# Patient Record
Sex: FEMALE | Race: WHITE | URBAN - METROPOLITAN AREA
[De-identification: names, ages, dates, MRNs, and addresses within clinical notes are randomized per-mention and may not be internally consistent; named-entity substitution may affect disease eponyms.]

---

## 2017-10-08 ENCOUNTER — HOSPITAL ENCOUNTER (EMERGENCY)
Facility: CLINIC | Age: 27
Discharge: HOME | End: 2017-10-08
Attending: EMERGENCY MEDICINE | Admitting: EMERGENCY MEDICINE
Payer: COMMERCIAL

## 2017-10-08 VITALS
DIASTOLIC BLOOD PRESSURE: 76 MMHG | RESPIRATION RATE: 16 BRPM | HEART RATE: 67 BPM | OXYGEN SATURATION: 98 % | SYSTOLIC BLOOD PRESSURE: 116 MMHG | TEMPERATURE: 98.7 F

## 2017-10-08 DIAGNOSIS — F41.0 PANIC ATTACK: ICD-10-CM

## 2017-10-08 DIAGNOSIS — F45.8 ACUTE HYPERVENTILATION SYNDROME: ICD-10-CM

## 2017-10-08 LAB
ALBUMIN UR-MCNC: 30 MG/DL
ANION GAP SERPL CALCULATED.3IONS-SCNC: 8 MMOL/L (ref 3–14)
APPEARANCE UR: ABNORMAL
BASOPHILS # BLD AUTO: 0.1 10E9/L (ref 0–0.2)
BASOPHILS NFR BLD AUTO: 0.7 %
BILIRUB UR QL STRIP: NEGATIVE
BUN SERPL-MCNC: 13 MG/DL (ref 7–30)
CA-I SERPL ISE-MCNC: 4.7 MG/DL (ref 4.4–5.2)
CALCIUM SERPL-MCNC: 9.5 MG/DL (ref 8.5–10.1)
CHLORIDE SERPL-SCNC: 104 MMOL/L (ref 94–109)
CO2 SERPL-SCNC: 27 MMOL/L (ref 20–32)
COLOR UR AUTO: YELLOW
CREAT SERPL-MCNC: 0.76 MG/DL (ref 0.52–1.04)
DIFFERENTIAL METHOD BLD: NORMAL
EOSINOPHIL # BLD AUTO: 0.4 10E9/L (ref 0–0.7)
EOSINOPHIL NFR BLD AUTO: 5.3 %
ERYTHROCYTE [DISTWIDTH] IN BLOOD BY AUTOMATED COUNT: 11.9 % (ref 10–15)
GFR SERPL CREATININE-BSD FRML MDRD: >90 ML/MIN/1.7M2
GLUCOSE SERPL-MCNC: 86 MG/DL (ref 70–99)
GLUCOSE UR STRIP-MCNC: NEGATIVE MG/DL
HCG UR QL: NEGATIVE
HCT VFR BLD AUTO: 44.8 % (ref 35–47)
HGB BLD-MCNC: 14.9 G/DL (ref 11.7–15.7)
HGB UR QL STRIP: NEGATIVE
IMM GRANULOCYTES # BLD: 0.1 10E9/L (ref 0–0.4)
IMM GRANULOCYTES NFR BLD: 1 %
KETONES UR STRIP-MCNC: NEGATIVE MG/DL
LEUKOCYTE ESTERASE UR QL STRIP: NEGATIVE
LIPASE SERPL-CCNC: 117 U/L (ref 73–393)
LYMPHOCYTES # BLD AUTO: 1.8 10E9/L (ref 0.8–5.3)
LYMPHOCYTES NFR BLD AUTO: 24.9 %
MCH RBC QN AUTO: 29.8 PG (ref 26.5–33)
MCHC RBC AUTO-ENTMCNC: 33.3 G/DL (ref 31.5–36.5)
MCV RBC AUTO: 90 FL (ref 78–100)
MONOCYTES # BLD AUTO: 0.5 10E9/L (ref 0–1.3)
MONOCYTES NFR BLD AUTO: 6.5 %
MUCOUS THREADS #/AREA URNS LPF: PRESENT /LPF
NEUTROPHILS # BLD AUTO: 4.3 10E9/L (ref 1.6–8.3)
NEUTROPHILS NFR BLD AUTO: 61.6 %
NITRATE UR QL: NEGATIVE
NRBC # BLD AUTO: 0 10*3/UL
NRBC BLD AUTO-RTO: 0 /100
PH UR STRIP: 9 PH (ref 5–7)
PLATELET # BLD AUTO: 234 10E9/L (ref 150–450)
POTASSIUM SERPL-SCNC: 3.4 MMOL/L (ref 3.4–5.3)
RBC # BLD AUTO: 5 10E12/L (ref 3.8–5.2)
RBC #/AREA URNS AUTO: <1 /HPF (ref 0–2)
SODIUM SERPL-SCNC: 139 MMOL/L (ref 133–144)
SOURCE: ABNORMAL
SP GR UR STRIP: 1.02 (ref 1–1.03)
SQUAMOUS #/AREA URNS AUTO: <1 /HPF (ref 0–1)
UROBILINOGEN UR STRIP-MCNC: 0 MG/DL (ref 0–2)
WBC # BLD AUTO: 7 10E9/L (ref 4–11)
WBC #/AREA URNS AUTO: 1 /HPF (ref 0–2)

## 2017-10-08 PROCEDURE — 25000128 H RX IP 250 OP 636: Performed by: EMERGENCY MEDICINE

## 2017-10-08 PROCEDURE — 99284 EMERGENCY DEPT VISIT MOD MDM: CPT | Mod: 25

## 2017-10-08 PROCEDURE — 83690 ASSAY OF LIPASE: CPT | Performed by: EMERGENCY MEDICINE

## 2017-10-08 PROCEDURE — 80048 BASIC METABOLIC PNL TOTAL CA: CPT | Performed by: EMERGENCY MEDICINE

## 2017-10-08 PROCEDURE — 82330 ASSAY OF CALCIUM: CPT | Performed by: EMERGENCY MEDICINE

## 2017-10-08 PROCEDURE — 81025 URINE PREGNANCY TEST: CPT | Performed by: EMERGENCY MEDICINE

## 2017-10-08 PROCEDURE — 96361 HYDRATE IV INFUSION ADD-ON: CPT

## 2017-10-08 PROCEDURE — 96375 TX/PRO/DX INJ NEW DRUG ADDON: CPT

## 2017-10-08 PROCEDURE — 93005 ELECTROCARDIOGRAM TRACING: CPT

## 2017-10-08 PROCEDURE — 81001 URINALYSIS AUTO W/SCOPE: CPT | Performed by: EMERGENCY MEDICINE

## 2017-10-08 PROCEDURE — 85025 COMPLETE CBC W/AUTO DIFF WBC: CPT | Performed by: EMERGENCY MEDICINE

## 2017-10-08 PROCEDURE — 96374 THER/PROPH/DIAG INJ IV PUSH: CPT

## 2017-10-08 RX ORDER — DIPHENHYDRAMINE HYDROCHLORIDE 50 MG/ML
25 INJECTION INTRAMUSCULAR; INTRAVENOUS ONCE
Status: COMPLETED | OUTPATIENT
Start: 2017-10-08 | End: 2017-10-08

## 2017-10-08 RX ORDER — DEXAMETHASONE SODIUM PHOSPHATE 10 MG/ML
10 INJECTION, SOLUTION INTRAMUSCULAR; INTRAVENOUS ONCE
Status: COMPLETED | OUTPATIENT
Start: 2017-10-08 | End: 2017-10-08

## 2017-10-08 RX ORDER — SODIUM CHLORIDE 9 MG/ML
1000 INJECTION, SOLUTION INTRAVENOUS CONTINUOUS
Status: DISCONTINUED | OUTPATIENT
Start: 2017-10-08 | End: 2017-10-08 | Stop reason: HOSPADM

## 2017-10-08 RX ADMIN — SODIUM CHLORIDE 1000 ML: 9 INJECTION, SOLUTION INTRAVENOUS at 19:19

## 2017-10-08 RX ADMIN — DEXAMETHASONE SODIUM PHOSPHATE 10 MG: 10 INJECTION, SOLUTION INTRAMUSCULAR; INTRAVENOUS at 19:20

## 2017-10-08 RX ADMIN — DIPHENHYDRAMINE HYDROCHLORIDE 25 MG: 50 INJECTION, SOLUTION INTRAMUSCULAR; INTRAVENOUS at 19:20

## 2017-10-08 RX ADMIN — PROCHLORPERAZINE EDISYLATE 10 MG: 5 INJECTION INTRAMUSCULAR; INTRAVENOUS at 19:19

## 2017-10-08 ASSESSMENT — ENCOUNTER SYMPTOMS
DIARRHEA: 0
HEADACHES: 1
VOMITING: 1
SHORTNESS OF BREATH: 1

## 2017-10-08 NOTE — ED AVS SNAPSHOT
[facility] Emergency Department    [address] [place] [address]    Phone:  [phone]    Fax:  [phone]                                       Lisa Melton   MRN: [id number]    Department:  [facility] Emergency Department   Date of Visit:  10/8/2017           Patient Information     Date Of Birth          1990        Your diagnoses for this visit were:     Acute hyperventilation syndrome     Panic attack        You were seen by Familia Campos MD.      Follow-up Information     Schedule an appointment as soon as possible for a visit with Lucia Blood.    Specialty:  Family Practice    Why:  As needed, If symptoms worsen    Contact information:    [facility]  [address]  [facility] [address]  [phone]          Discharge Instructions         Understanding Hyperventilation Syndrome  When you breathe, you get oxygen from the air you inhale. You then let out carbon dioxide with the air you exhale. Hyperventilation syndrome is a pattern of breathing during which you breathe more quickly and deeply than normal. This can be very distressing to experience. If it goes on for some time, it can cause the level of carbon dioxide in the blood to get too low. This can lead to symptoms felt throughout the body.  What causes hyperventilation syndrome?  Hyperventilation syndrome may be caused from things such as:    Anxiety or panic (most common)    Pregnancy    Bleeding    Infection    Certain heart and lung problems  Symptoms of hyperventilation syndrome  Symptoms can include:    Fast or deep breathing    Shortness of breath or the feeling that you can t get enough air    Anxiety, fear, panic, or strong feeling of dread or doom    Dizziness    Chest pain or squeezing in the chest    Fast, pounding, or skipping heartbeat    Sweating    Numbness or tingling around the mouth and in the fingers    Muscle cramps in the hands or  feet  Treatment for hyperventilation syndrome  Treatment is focused on getting your breathing rate and level of carbon dioxide in the blood back to normal. If you are being treated in a hospital or healthcare provider s office, the following may be done:    A healthcare provider may monitor the level of oxygen in your blood with a pulse oximeter.    A nurse or other healthcare provider will talk with you and help you to stay calm.    You may be asked to try various breathing exercises, such as pursed-lip breathing. This helps slow down your breathing. You may also be asked to hold your breath for short periods.    If needed, you may be told to breathe into a tube.    You may also be given medicine to help you relax.  How can hyperventilation syndrome be prevented?  To help prevent further episodes in the future, you may be told to try:    Breathing exercises    Relaxation methods such as meditation or progressive muscle relaxation    Regular exercise    Counseling or medicines to help manage an anxiety or panic disorder  Possible complications of hyperventilation syndrome  If the level of carbon dioxide becomes dangerously low, this is called hypocapnia. It can upset the acid-base balance in the blood and cause problems such as fainting and seizures.  Other possible complications of hyperventilation syndrome will vary depending on the cause.  When to call your healthcare provider  Call your healthcare provider right away if you have any of these:    Fever of 100.4 F (38 C) or higher, or as directed    Symptoms that don t get better with treatment, or symptoms that get worse    New symptoms   Date Last Reviewed: 5/1/2016 2000-2017 [employer]. [address] [place] [address], [place] [address]. All rights reserved. This information is not intended as a substitute for professional medical care. Always follow your healthcare professional's instructions.          24 Hour Appointment Hotline       To make an  appointment at any [facility], call [phone] ([phone]). If you don't have a family doctor or clinic, we will help you find one. [facility] are conveniently located to serve the needs of you and your family.             Review of your medicines      Notice     You have not been prescribed any medications.            Procedures and tests performed during your visit     Basic metabolic panel    CBC with platelets differential    Calcium ionized    EKG 12-lead, tracing only    HCG qualitative urine    Lipase    UA with Microscopic      Orders Needing Specimen Collection     None      Pending Results     Date and Time Order Name Status Description    10/8/2017 1821 EKG 12-lead, tracing only Preliminary             Pending Culture Results     No orders found from 10/6/2017 to 10/9/2017.            Pending Results Instructions     If you had any lab results that were not finalized at the time of your Discharge, you can call the ED Lab Result RN at [phone]. You will be contacted by this team for any positive Lab results or changes in treatment. The nurses are available 7 days a week from 10A to 6:30P.  You can leave a message 24 hours per day and they will return your call.        Test Results From Your Hospital Stay        10/8/2017  7:04 PM      Component Results     Component Value Ref Range & Units Status    Color Urine Yellow  Final    Appearance Urine Slightly Cloudy  Final    Glucose Urine Negative NEG^Negative mg/dL Final    Bilirubin Urine Negative NEG^Negative Final    Ketones Urine Negative NEG^Negative mg/dL Final    Specific Gravity Urine 1.020 1.003 - 1.035 Final    Blood Urine Negative NEG^Negative Final    pH Urine 9.0 (H) 5.0 - 7.0 pH Final    Protein Albumin Urine 30 (A) NEG^Negative mg/dL Final    Urobilinogen mg/dL 0.0 0.0 - 2.0 mg/dL Final    Nitrite Urine Negative NEG^Negative Final    Leukocyte Esterase Urine Negative NEG^Negative Final    Source Midstream Urine  Final     WBC Urine 1 0 - 2 /HPF Final    RBC Urine <1 0 - 2 /HPF Final    Squamous Epithelial /HPF Urine <1 0 - 1 /HPF Final    Mucous Urine Present (A) NEG^Negative /LPF Final         10/8/2017  7:01 PM      Component Results     Component Value Ref Range & Units Status    HCG Qual Urine Negative NEG^Negative Final    This test is for screening purposes.  Results should be interpreted along with   the clinical picture.  Confirmation testing is available if warranted by   ordering [id number], HCG Quantitative Pregnancy.           10/8/2017  7:27 PM      Component Results     Component Value Ref Range & Units Status    WBC 7.0 4.0 - 11.0 10e9/L Final    RBC Count 5.00 3.8 - 5.2 10e12/L Final    Hemoglobin 14.9 11.7 - 15.7 g/dL Final    Hematocrit 44.8 35.0 - 47.0 % Final    MCV 90 78 - 100 fl Final    MCH 29.8 26.5 - 33.0 pg Final    MCHC 33.3 31.5 - 36.5 g/dL Final    RDW 11.9 10.0 - 15.0 % Final    Platelet Count 234 150 - 450 10e9/L Final    Diff Method Automated Method  Final    % Neutrophils 61.6 % Final    % Lymphocytes 24.9 % Final    % Monocytes 6.5 % Final    % Eosinophils 5.3 % Final    % Basophils 0.7 % Final    % Immature Granulocytes 1.0 % Final    Nucleated RBCs 0 0 /100 Final    Absolute Neutrophil 4.3 1.6 - 8.3 10e9/L Final    Absolute Lymphocytes 1.8 0.8 - 5.3 10e9/L Final    Absolute Monocytes 0.5 0.0 - 1.3 10e9/L Final    Absolute Eosinophils 0.4 0.0 - 0.7 10e9/L Final    Absolute Basophils 0.1 0.0 - 0.2 10e9/L Final    Abs Immature Granulocytes 0.1 0 - 0.4 10e9/L Final    Absolute Nucleated RBC 0.0  Final         10/8/2017  7:45 PM      Component Results     Component Value Ref Range & Units Status    Sodium 139 133 - 144 mmol/L Final    Potassium 3.4 3.4 - 5.3 mmol/L Final    Chloride 104 94 - 109 mmol/L Final    Carbon Dioxide 27 20 - 32 mmol/L Final    Anion Gap 8 3 - 14 mmol/L Final    Glucose 86 70 - 99 mg/dL Final    Urea Nitrogen 13 7 - 30 mg/dL Final    Creatinine 0.76 0.52 - 1.04 mg/dL Final    GFR  Estimate >90 >60 mL/min/1.7m2 Final    Non  GFR Calc    GFR Estimate If Black >90 >60 mL/min/1.7m2 Final    African American GFR Calc    Calcium 9.5 8.5 - 10.1 mg/dL Final         10/8/2017  7:45 PM      Component Results     Component Value Ref Range & Units Status    Lipase 117 73 - 393 U/L Final         10/8/2017  7:26 PM      Component Results     Component Value Ref Range & Units Status    Calcium Ionized 4.7 4.4 - 5.2 mg/dL Final                Clinical Quality Measure: Blood Pressure Screening     Your blood pressure was checked while you were in the emergency department today. The last reading we obtained was  BP: 117/77 . Please read the guidelines below about what these numbers mean and what you should do about them.  If your systolic blood pressure (the top number) is less than 120 and your diastolic blood pressure (the bottom number) is less than 80, then your blood pressure is normal. There is nothing more that you need to do about it.  If your systolic blood pressure (the top number) is 120-139 or your diastolic blood pressure (the bottom number) is 80-89, your blood pressure may be higher than it should be. You should have your blood pressure rechecked within a year by a primary care provider.  If your systolic blood pressure (the top number) is 140 or greater or your diastolic blood pressure (the bottom number) is 90 or greater, you may have high blood pressure. High blood pressure is treatable, but if left untreated over time it can put you at risk for heart attack, stroke, or kidney failure. You should have your blood pressure rechecked by a primary care provider within the next 4 weeks.  If your provider in the emergency department today gave you specific instructions to follow-up with your doctor or provider even sooner than that, you should follow that instruction and not wait for up to 4 weeks for your follow-up visit.        Thank you for choosing [facility]       Thank you for  "choosing [facility] for your care. Our goal is always to provide you with excellent care. Hearing back from our patients is one way we can continue to improve our services. Please take a few minutes to complete the written survey that you may receive in the mail after you visit with us. Thank you!        [employer] Information     [employer] lets you send messages to your doctor, view your test results, renew your prescriptions, schedule appointments and more. To sign up, go to [email or web address] . Click on \"Log in\" on the left side of the screen, which will take you to the Welcome page. Then click on \"Sign up Now\" on the right side of the page.     You will be asked to enter the access code listed below, as well as some personal information. Please follow the directions to create your username and password.     Your access code is: [email or web address]  Expires: 2018  8:12 PM     Your access code will  in 90 days. If you need help or a new code, please call your [facility] clinic or [phone].        Care EveryWhere ID     This is your Care EveryWhere ID. This could be used by other organizations to access your [facility] medical records  [id number]        Equal Access to Services     BENITO HURST : Savannah Hankins, ashley patricia, qajaswinder kaalshayan wharton, simona quan. So [facility] [phone].    ATENCIÓN: Si habla español, tiene a casillas disposición servicios gratuitos de asistencia lingüística. Llame al [phone].    We comply with applicable federal civil rights laws and [place] laws. We do not discriminate on the basis of race, color, national origin, age, disability, sex, sexual orientation, or gender identity.            After Visit Summary       This is your record. Keep this with you and show to your community pharmacist(s) and doctor(s) at your next visit.                  "

## 2017-10-08 NOTE — ED NOTES
"Patient was driving and felt like she was having a panic attack. States she has attacks but never with the symptoms noted. States that her hands were \"contracted\" and she couldn't catch her breath and currently has a headache 7/10.  "

## 2017-10-08 NOTE — ED PROVIDER NOTES
"  History     Chief Complaint:  Panic Attack      HPI   Lisa Melton is a 27 year old female with history of anxiety who presents to the emergency department today via EMS for evaluation of a panic attack. The patient reports she was driving prior to arrival when she began having a panic attack with associated loss of muscle control including hand \"contraction,\" as well as shortness of breath and headache. Therefore, she pulled over the car and called EMS, presenting to the emergency department for evaluation. On presentation, she states EMS told her she probably had a panic attack. She states she thought she was going to pass out in the car. She states she vomited on arrival to emergency department which she attributes to the headache. Since arriving, symptoms have resolved. She denies diarrhea, rash, bites.    Allergies:  No Known Drug Allergies    Medications:    Lexapro    Past Medical History:    Anxiety    Past Surgical History:    History reviewed. No pertinent past surgical history.    Family History:    History reviewed. No pertinent family history.     Social History:  Marital Status:       Review of Systems   Respiratory: Positive for shortness of breath.    Gastrointestinal: Positive for vomiting. Negative for diarrhea.   Skin: Negative for rash.   Neurological: Positive for headaches.   Psychiatric/Behavioral:        \"panic attack\"   All other systems reviewed and are negative.    Physical Exam   First Vitals:  BP: (!) 130/92  Pulse: 67  Temp: 98.7  F (37.1  C)  Resp: 18  SpO2: 97 %      Physical Exam  Constitutional: Patient is well appearing. No distress.  Head: Atraumatic.  Mouth/Throat: Oropharynx is clear and moist. No oropharyngeal exudate.  Eyes: Conjunctivae and EOM are normal. No scleral icterus.  Neck: Normal range of motion. Neck supple.   Cardiovascular: Normal rate, regular rhythm, normal heart sounds and intact distal pulses.   Pulmonary/Chest: Breath sounds normal. No " respiratory distress.  Abdominal: Soft. Bowel sounds are normal. No distension. No tenderness. No rebound or guarding.   Musculoskeletal: Normal range of motion. No edema or tenderness.   Neurological: Alert and orientated to person, place, and time. No observable focal neuro deficit  Skin: Warm and dry. No rash noted. Not diaphoretic.     Emergency Department Course     ECG:  Indication: Panic Attack  Completed at 1840.  Read at 1856.   Normal sinus rhythm with sinus arrhythmia   Rightward axis  Nonspecific T wave abnormality  Abnormal ECG  Rate 61 bpm. NY interval 166. QRS duration 94. QT/QTc 448/450. P-R-T axes 60 95 25.    Laboratory:  Laboratory findings were communicated with the patient who voiced understanding of the findings.  UA with Microscopic: pH Urine 9.0 (H), Protein Albumin Urine 30, Mucous Urine Present, o/w WNL.  HCG Qualitative Urine: Negative  CBC: AWNL. (WBC 7.0, HGB 14.9, )   BMP: AWNL (Creatinine 0.76)  Lipase: 117  Calcium ionized: 4.7    Interventions:  1919 NS Bolus 1,000mL IV  1919 Compazine 10 mg IV  1920 Benadryl 25 mg IV  1920 Decadron 10 mg IV     Emergency Department Course:  Nursing notes and vitals reviewed.  IV was inserted and blood was drawn for laboratory testing, results above.  The patient provided a urine sample here in the emergency department. This was sent for laboratory testing, findings above.  1852: I performed an exam of the patient as documented above.   2012: Patient rechecked and updated.   Findings and plan explained to the Patient. Patient discharged home with instructions regarding supportive care, medications, and reasons to return. The importance of close follow-up was reviewed.  I personally reviewed the laboratory results with the Patient and answered all related questions prior to discharge.    Impression & Plan      Medical Decision Making:  Very consistent hyperventilation syndrome appearance.  Not hypocalcemia.  PERC neg.  Normal EKG unlikely ACS.    Had complete relief prior to arrival.  Denies Si Hi AH VH.  No other systemic disease presenting at this time.       Diagnosis:    ICD-10-CM    1. Acute hyperventilation syndrome F45.8    2. Panic attack F41.0        Disposition:  Discharged to home    Scribe Disclosure:  I, Myriam Lowery, am serving as a scribe at 6:27 PM on 10/8/2017 to document services personally performed by Familia Campos MD based on my observations and the provider's statements to me.   10/8/2017   [facility] EMERGENCY DEPARTMENT       Familia Campos MD  10/09/17 0040

## 2017-10-08 NOTE — ED AVS SNAPSHOT
[facility] Emergency Department    [address]    [facility] [address]    Phone:  [phone]    Fax:  [phone]                                       Lisa Melton   MRN: [id number]    Department:  [facility] Emergency Department   Date of Visit:  10/8/2017           After Visit Summary Signature Page     I have received my discharge instructions, and my questions have been answered. I have discussed any challenges I see with this plan with the nurse or doctor.    ..........................................................................................................................................  Patient/Patient Representative Signature      ..........................................................................................................................................  Patient Representative Print Name and Relationship to Patient    ..................................................               ................................................  Date                                            Time    ..........................................................................................................................................  Reviewed by Signature/Title    ...................................................              ..............................................  Date                                                            Time

## 2017-10-09 LAB — INTERPRETATION ECG - MUSE: NORMAL

## 2017-10-09 NOTE — DISCHARGE INSTRUCTIONS
Understanding Hyperventilation Syndrome  When you breathe, you get oxygen from the air you inhale. You then let out carbon dioxide with the air you exhale. Hyperventilation syndrome is a pattern of breathing during which you breathe more quickly and deeply than normal. This can be very distressing to experience. If it goes on for some time, it can cause the level of carbon dioxide in the blood to get too low. This can lead to symptoms felt throughout the body.  What causes hyperventilation syndrome?  Hyperventilation syndrome may be caused from things such as:    Anxiety or panic (most common)    Pregnancy    Bleeding    Infection    Certain heart and lung problems  Symptoms of hyperventilation syndrome  Symptoms can include:    Fast or deep breathing    Shortness of breath or the feeling that you can t get enough air    Anxiety, fear, panic, or strong feeling of dread or doom    Dizziness    Chest pain or squeezing in the chest    Fast, pounding, or skipping heartbeat    Sweating    Numbness or tingling around the mouth and in the fingers    Muscle cramps in the hands or feet  Treatment for hyperventilation syndrome  Treatment is focused on getting your breathing rate and level of carbon dioxide in the blood back to normal. If you are being treated in a hospital or healthcare provider s office, the following may be done:    A healthcare provider may monitor the level of oxygen in your blood with a pulse oximeter.    A nurse or other healthcare provider will talk with you and help you to stay calm.    You may be asked to try various breathing exercises, such as pursed-lip breathing. This helps slow down your breathing. You may also be asked to hold your breath for short periods.    If needed, you may be told to breathe into a tube.    You may also be given medicine to help you relax.  How can hyperventilation syndrome be prevented?  To help prevent further episodes in the future, you may be told to  try:    Breathing exercises    Relaxation methods such as meditation or progressive muscle relaxation    Regular exercise    Counseling or medicines to help manage an anxiety or panic disorder  Possible complications of hyperventilation syndrome  If the level of carbon dioxide becomes dangerously low, this is called hypocapnia. It can upset the acid-base balance in the blood and cause problems such as fainting and seizures.  Other possible complications of hyperventilation syndrome will vary depending on the cause.  When to call your healthcare provider  Call your healthcare provider right away if you have any of these:    Fever of 100.4 F (38 C) or higher, or as directed    Symptoms that don t get better with treatment, or symptoms that get worse    New symptoms   Date Last Reviewed: 5/1/2016 2000-2017 [employer]. [address] [place] [address], [place] [address]. All rights reserved. This information is not intended as a substitute for professional medical care. Always follow your healthcare professional's instructions.

## 2020-08-18 DIAGNOSIS — Z11.59 ENCOUNTER FOR SCREENING FOR OTHER VIRAL DISEASES: Primary | ICD-10-CM

## 2020-09-14 DIAGNOSIS — Z11.59 ENCOUNTER FOR SCREENING FOR OTHER VIRAL DISEASES: ICD-10-CM

## 2020-09-14 PROCEDURE — U0003 INFECTIOUS AGENT DETECTION BY NUCLEIC ACID (DNA OR RNA); SEVERE ACUTE RESPIRATORY SYNDROME CORONAVIRUS 2 (SARS-COV-2) (CORONAVIRUS DISEASE [COVID-19]), AMPLIFIED PROBE TECHNIQUE, MAKING USE OF HIGH THROUGHPUT TECHNOLOGIES AS DESCRIBED BY CMS-2020-01-R: HCPCS | Performed by: PLASTIC SURGERY

## 2020-09-15 LAB
SARS-COV-2 RNA SPEC QL NAA+PROBE: NOT DETECTED
SPECIMEN SOURCE: NORMAL

## 2020-09-15 RX ORDER — SERTRALINE HYDROCHLORIDE 100 MG/1
100 TABLET, FILM COATED ORAL DAILY
COMMUNITY

## 2020-09-15 RX ORDER — CETIRIZINE HYDROCHLORIDE 10 MG/1
10 TABLET ORAL DAILY
COMMUNITY

## 2020-09-15 ASSESSMENT — MIFFLIN-ST. JEOR: SCORE: 1405.35

## 2020-09-16 ENCOUNTER — ANESTHESIA EVENT (OUTPATIENT)
Dept: SURGERY | Facility: AMBULATORY SURGERY CENTER | Age: 30
End: 2020-09-16

## 2020-09-17 ENCOUNTER — HOSPITAL ENCOUNTER (OUTPATIENT)
Facility: AMBULATORY SURGERY CENTER | Age: 30
Discharge: HOME | End: 2020-09-17
Attending: PLASTIC SURGERY | Admitting: PLASTIC SURGERY

## 2020-09-17 ENCOUNTER — ANESTHESIA (OUTPATIENT)
Dept: SURGERY | Facility: AMBULATORY SURGERY CENTER | Age: 30
End: 2020-09-17
Payer: COMMERCIAL

## 2020-09-17 VITALS
OXYGEN SATURATION: 100 % | SYSTOLIC BLOOD PRESSURE: 111 MMHG | HEART RATE: 64 BPM | TEMPERATURE: 96.6 F | WEIGHT: 145 LBS | RESPIRATION RATE: 13 BRPM | DIASTOLIC BLOOD PRESSURE: 66 MMHG | HEIGHT: 67 IN | BODY MASS INDEX: 22.76 KG/M2

## 2020-09-17 DIAGNOSIS — B99.9 INFECTION: ICD-10-CM

## 2020-09-17 DIAGNOSIS — R52 PAIN: Primary | ICD-10-CM

## 2020-09-17 DIAGNOSIS — T88.59XA NAUSEA AFTER ANESTHESIA, INITIAL ENCOUNTER: ICD-10-CM

## 2020-09-17 DIAGNOSIS — R11.0 NAUSEA AFTER ANESTHESIA, INITIAL ENCOUNTER: ICD-10-CM

## 2020-09-17 LAB — HCG UR QL: NEGATIVE

## 2020-09-17 PROCEDURE — 81025 URINE PREGNANCY TEST: CPT | Performed by: ANESTHESIOLOGY

## 2020-09-17 PROCEDURE — 36000139 ZZH SURGERY LEVEL COSMETIC 90 MIN

## 2020-09-17 PROCEDURE — G8907 PT DOC NO EVENTS ON DISCHARG: HCPCS

## 2020-09-17 PROCEDURE — L8600 IMPLANT BREAST SILICONE/EQ: HCPCS

## 2020-09-17 PROCEDURE — G8918 PT W/O PREOP ORDER IV AB PRO: HCPCS

## 2020-09-17 RX ORDER — PHYSOSTIGMINE SALICYLATE 1 MG/ML
1.2 INJECTION INTRAVENOUS
Status: DISCONTINUED | OUTPATIENT
Start: 2020-09-17 | End: 2020-09-18 | Stop reason: HOSPADM

## 2020-09-17 RX ORDER — HYDROMORPHONE HYDROCHLORIDE 1 MG/ML
.3-.5 INJECTION, SOLUTION INTRAMUSCULAR; INTRAVENOUS; SUBCUTANEOUS EVERY 10 MIN PRN
Status: DISCONTINUED | OUTPATIENT
Start: 2020-09-17 | End: 2020-09-18 | Stop reason: HOSPADM

## 2020-09-17 RX ORDER — OXYCODONE AND ACETAMINOPHEN 5; 325 MG/1; MG/1
2 TABLET ORAL
Status: DISCONTINUED | OUTPATIENT
Start: 2020-09-17 | End: 2020-09-18 | Stop reason: HOSPADM

## 2020-09-17 RX ORDER — HYDROXYZINE HYDROCHLORIDE 25 MG/1
25 TABLET, FILM COATED ORAL
Status: DISCONTINUED | OUTPATIENT
Start: 2020-09-17 | End: 2020-09-18 | Stop reason: HOSPADM

## 2020-09-17 RX ORDER — LIDOCAINE HYDROCHLORIDE 20 MG/ML
INJECTION, SOLUTION INFILTRATION; PERINEURAL PRN
Status: DISCONTINUED | OUTPATIENT
Start: 2020-09-17 | End: 2020-09-17

## 2020-09-17 RX ORDER — HYDROXYZINE PAMOATE 25 MG/1
25 CAPSULE ORAL EVERY 6 HOURS PRN
Qty: 30 CAPSULE | Refills: 0
Start: 2020-09-17

## 2020-09-17 RX ORDER — ONDANSETRON 2 MG/ML
INJECTION INTRAMUSCULAR; INTRAVENOUS PRN
Status: DISCONTINUED | OUTPATIENT
Start: 2020-09-17 | End: 2020-09-17

## 2020-09-17 RX ORDER — LIDOCAINE 40 MG/G
CREAM TOPICAL
Status: DISCONTINUED | OUTPATIENT
Start: 2020-09-17 | End: 2020-09-18 | Stop reason: HOSPADM

## 2020-09-17 RX ORDER — FENTANYL CITRATE 50 UG/ML
25-50 INJECTION, SOLUTION INTRAMUSCULAR; INTRAVENOUS
Status: DISCONTINUED | OUTPATIENT
Start: 2020-09-17 | End: 2020-09-18 | Stop reason: HOSPADM

## 2020-09-17 RX ORDER — ONDANSETRON 4 MG/1
4-8 TABLET, ORALLY DISINTEGRATING ORAL EVERY 8 HOURS PRN
Qty: 4 TABLET | Refills: 0
Start: 2020-09-17

## 2020-09-17 RX ORDER — OXYCODONE HYDROCHLORIDE 5 MG/1
10 TABLET ORAL EVERY 4 HOURS PRN
Status: DISCONTINUED | OUTPATIENT
Start: 2020-09-17 | End: 2020-09-18 | Stop reason: HOSPADM

## 2020-09-17 RX ORDER — ONDANSETRON 4 MG/1
4 TABLET, ORALLY DISINTEGRATING ORAL
Status: DISCONTINUED | OUTPATIENT
Start: 2020-09-17 | End: 2020-09-18 | Stop reason: HOSPADM

## 2020-09-17 RX ORDER — CEFAZOLIN SODIUM 2 G/100ML
2 INJECTION, SOLUTION INTRAVENOUS
Status: COMPLETED | OUTPATIENT
Start: 2020-09-17 | End: 2020-09-17

## 2020-09-17 RX ORDER — FENTANYL CITRATE 50 UG/ML
INJECTION, SOLUTION INTRAMUSCULAR; INTRAVENOUS PRN
Status: DISCONTINUED | OUTPATIENT
Start: 2020-09-17 | End: 2020-09-17

## 2020-09-17 RX ORDER — METOPROLOL TARTRATE 1 MG/ML
1-2 INJECTION, SOLUTION INTRAVENOUS EVERY 5 MIN PRN
Status: DISCONTINUED | OUTPATIENT
Start: 2020-09-17 | End: 2020-09-18 | Stop reason: HOSPADM

## 2020-09-17 RX ORDER — PROPOFOL 10 MG/ML
INJECTION, EMULSION INTRAVENOUS CONTINUOUS PRN
Status: DISCONTINUED | OUTPATIENT
Start: 2020-09-17 | End: 2020-09-17

## 2020-09-17 RX ORDER — HYDRALAZINE HYDROCHLORIDE 20 MG/ML
2.5-5 INJECTION INTRAMUSCULAR; INTRAVENOUS EVERY 10 MIN PRN
Status: DISCONTINUED | OUTPATIENT
Start: 2020-09-17 | End: 2020-09-18 | Stop reason: HOSPADM

## 2020-09-17 RX ORDER — SODIUM CHLORIDE, SODIUM LACTATE, POTASSIUM CHLORIDE, CALCIUM CHLORIDE 600; 310; 30; 20 MG/100ML; MG/100ML; MG/100ML; MG/100ML
INJECTION, SOLUTION INTRAVENOUS CONTINUOUS
Status: DISCONTINUED | OUTPATIENT
Start: 2020-09-17 | End: 2020-09-18 | Stop reason: HOSPADM

## 2020-09-17 RX ORDER — NALOXONE HYDROCHLORIDE 0.4 MG/ML
.1-.4 INJECTION, SOLUTION INTRAMUSCULAR; INTRAVENOUS; SUBCUTANEOUS
Status: DISCONTINUED | OUTPATIENT
Start: 2020-09-17 | End: 2020-09-18 | Stop reason: HOSPADM

## 2020-09-17 RX ORDER — BUPIVACAINE HYDROCHLORIDE AND EPINEPHRINE 2.5; 5 MG/ML; UG/ML
INJECTION, SOLUTION INFILTRATION; PERINEURAL PRN
Status: DISCONTINUED | OUTPATIENT
Start: 2020-09-17 | End: 2020-09-17 | Stop reason: HOSPADM

## 2020-09-17 RX ORDER — DEXAMETHASONE SODIUM PHOSPHATE 4 MG/ML
4 INJECTION, SOLUTION INTRA-ARTICULAR; INTRALESIONAL; INTRAMUSCULAR; INTRAVENOUS; SOFT TISSUE EVERY 10 MIN PRN
Status: DISCONTINUED | OUTPATIENT
Start: 2020-09-17 | End: 2020-09-18 | Stop reason: HOSPADM

## 2020-09-17 RX ORDER — CEPHALEXIN 500 MG/1
500 CAPSULE ORAL 2 TIMES DAILY
Qty: 28 CAPSULE | Refills: 0
Start: 2020-09-17 | End: 2020-09-24

## 2020-09-17 RX ORDER — ONDANSETRON 4 MG/1
4 TABLET, ORALLY DISINTEGRATING ORAL EVERY 30 MIN PRN
Status: DISCONTINUED | OUTPATIENT
Start: 2020-09-17 | End: 2020-09-18 | Stop reason: HOSPADM

## 2020-09-17 RX ORDER — ONDANSETRON 2 MG/ML
4 INJECTION INTRAMUSCULAR; INTRAVENOUS EVERY 30 MIN PRN
Status: DISCONTINUED | OUTPATIENT
Start: 2020-09-17 | End: 2020-09-18 | Stop reason: HOSPADM

## 2020-09-17 RX ORDER — DEXAMETHASONE SODIUM PHOSPHATE 4 MG/ML
10 INJECTION, SOLUTION INTRA-ARTICULAR; INTRALESIONAL; INTRAMUSCULAR; INTRAVENOUS; SOFT TISSUE
Status: COMPLETED | OUTPATIENT
Start: 2020-09-17 | End: 2020-09-17

## 2020-09-17 RX ORDER — OXYCODONE AND ACETAMINOPHEN 5; 325 MG/1; MG/1
1-2 TABLET ORAL EVERY 4 HOURS PRN
Qty: 20 TABLET | Refills: 0
Start: 2020-09-17

## 2020-09-17 RX ORDER — PROPOFOL 10 MG/ML
INJECTION, EMULSION INTRAVENOUS PRN
Status: DISCONTINUED | OUTPATIENT
Start: 2020-09-17 | End: 2020-09-17

## 2020-09-17 RX ORDER — ALBUTEROL SULFATE 0.83 MG/ML
2.5 SOLUTION RESPIRATORY (INHALATION) EVERY 4 HOURS PRN
Status: DISCONTINUED | OUTPATIENT
Start: 2020-09-17 | End: 2020-09-18 | Stop reason: HOSPADM

## 2020-09-17 RX ORDER — ACETAMINOPHEN 325 MG/1
975 TABLET ORAL ONCE
Status: COMPLETED | OUTPATIENT
Start: 2020-09-17 | End: 2020-09-17

## 2020-09-17 RX ORDER — MEPERIDINE HYDROCHLORIDE 25 MG/ML
12.5 INJECTION INTRAMUSCULAR; INTRAVENOUS; SUBCUTANEOUS
Status: DISCONTINUED | OUTPATIENT
Start: 2020-09-17 | End: 2020-09-18 | Stop reason: HOSPADM

## 2020-09-17 RX ADMIN — FENTANYL CITRATE 50 MCG: 50 INJECTION, SOLUTION INTRAMUSCULAR; INTRAVENOUS at 08:25

## 2020-09-17 RX ADMIN — PROPOFOL 50 MG: 10 INJECTION, EMULSION INTRAVENOUS at 07:32

## 2020-09-17 RX ADMIN — PROPOFOL 250 MG: 10 INJECTION, EMULSION INTRAVENOUS at 07:23

## 2020-09-17 RX ADMIN — PROPOFOL 50 MG: 10 INJECTION, EMULSION INTRAVENOUS at 07:29

## 2020-09-17 RX ADMIN — PROPOFOL 50 MG: 10 INJECTION, EMULSION INTRAVENOUS at 07:38

## 2020-09-17 RX ADMIN — PROPOFOL 50 MG: 10 INJECTION, EMULSION INTRAVENOUS at 08:04

## 2020-09-17 RX ADMIN — LIDOCAINE HYDROCHLORIDE 100 MG: 20 INJECTION, SOLUTION INFILTRATION; PERINEURAL at 07:23

## 2020-09-17 RX ADMIN — ONDANSETRON 4 MG: 2 INJECTION INTRAMUSCULAR; INTRAVENOUS at 08:25

## 2020-09-17 RX ADMIN — DEXAMETHASONE SODIUM PHOSPHATE 10 MG: 4 INJECTION, SOLUTION INTRA-ARTICULAR; INTRALESIONAL; INTRAMUSCULAR; INTRAVENOUS; SOFT TISSUE at 07:38

## 2020-09-17 RX ADMIN — CEFAZOLIN SODIUM 2 G: 2 INJECTION, SOLUTION INTRAVENOUS at 07:29

## 2020-09-17 RX ADMIN — PROPOFOL 200 MCG/KG/MIN: 10 INJECTION, EMULSION INTRAVENOUS at 07:27

## 2020-09-17 RX ADMIN — OXYCODONE HYDROCHLORIDE 5 MG: 5 TABLET ORAL at 09:10

## 2020-09-17 RX ADMIN — ACETAMINOPHEN 975 MG: 325 TABLET ORAL at 06:42

## 2020-09-17 RX ADMIN — FENTANYL CITRATE 50 MCG: 50 INJECTION, SOLUTION INTRAMUSCULAR; INTRAVENOUS at 07:30

## 2020-09-17 RX ADMIN — SODIUM CHLORIDE, SODIUM LACTATE, POTASSIUM CHLORIDE, CALCIUM CHLORIDE: 600; 310; 30; 20 INJECTION, SOLUTION INTRAVENOUS at 06:51

## 2020-09-17 RX ADMIN — FENTANYL CITRATE 50 MCG: 50 INJECTION, SOLUTION INTRAMUSCULAR; INTRAVENOUS at 07:37

## 2020-09-17 NOTE — ANESTHESIA PREPROCEDURE EVALUATION
"Anesthesia Pre-Procedure Evaluation    Patient: Lisa Melton   MRN:     [id number] Gender:   female   Age:    30 year old :      1990        Preoperative Diagnosis: Encounter for cosmetic procedure [Z41.1]  Ruptured left breast implant [T85.43XA]   Procedure(s):  Remove and Replace breast implants     LABS:  CBC:   Lab Results   Component Value Date    WBC 7.0 10/08/2017    HGB 14.9 10/08/2017    HCT 44.8 10/08/2017     10/08/2017     BMP:   Lab Results   Component Value Date     10/08/2017    POTASSIUM 3.4 10/08/2017    CHLORIDE 104 10/08/2017    CO2 27 10/08/2017    BUN 13 10/08/2017    CR 0.76 10/08/2017    GLC 86 10/08/2017     COAGS: No results found for: PTT, INR, FIBR  POC:   Lab Results   Component Value Date    HCG Negative 2020     OTHER:   Lab Results   Component Value Date    ANA 9.5 10/08/2017    LIPASE 117 10/08/2017        Preop Vitals    BP Readings from Last 3 Encounters:   20 111/66   10/08/17 116/76    Pulse Readings from Last 3 Encounters:   20 64   10/08/17 67      Resp Readings from Last 3 Encounters:   20 13   10/08/17 16    SpO2 Readings from Last 3 Encounters:   20 100%   10/08/17 98%      Temp Readings from Last 1 Encounters:   20 35.9  C (96.6  F) (Temporal)    Ht Readings from Last 1 Encounters:   09/15/20 1.702 m (5' 7\")      Wt Readings from Last 1 Encounters:   09/15/20 65.8 kg (145 lb)    Estimated body mass index is 22.71 kg/m  as calculated from the following:    Height as of this encounter: 1.702 m (5' 7\").    Weight as of this encounter: 65.8 kg (145 lb).     LDA:  Peripheral IV 10/08/17 Right (Active)   Number of days: 1075        History reviewed. No pertinent past medical history.   Past Surgical History:   Procedure Laterality Date     COSMETIC SURGERY        No Known Allergies     Anesthesia Evaluation     .             ROS/MED HX    ENT/Pulmonary:  - neg pulmonary ROS     Neurologic:  - neg neurologic ROS   "   Cardiovascular:  - neg cardiovascular ROS       METS/Exercise Tolerance:     Hematologic:  - neg hematologic  ROS       Musculoskeletal:  - neg musculoskeletal ROS       GI/Hepatic:  - neg GI/hepatic ROS       Renal/Genitourinary:  - ROS Renal section negative       Endo:  - neg endo ROS       Psychiatric:  - neg psychiatric ROS       Infectious Disease:  - neg infectious disease ROS       Malignancy:      - no malignancy   Other:    - neg other ROS                     PHYSICAL EXAM:   Mental Status/Neuro: A/A/O   Airway: Facies: Feasible  Mallampati: I  Mouth/Opening: Full  TM distance: > 6 cm  Neck ROM: Full   Respiratory: Auscultation: CTAB     Resp. Rate: Normal     Resp. Effort: Normal      CV: Rhythm: Regular  Rate: Age appropriate  Heart: Normal Sounds  Edema: None   Comments:      Dental: Normal Dentition                Assessment:   ASA SCORE: 1    H&P: History and physical reviewed and following examination; no interval change.   Smoking Status:  Non-Smoker/Unknown   NPO Status: NPO Appropriate     Plan:   Anes. Type:  General   Pre-Medication: None   Induction:  IV (Standard)   Airway: LMA   Access/Monitoring: PIV   Maintenance: Balanced     Postop Plan:   Postop Pain: Opioids  Postop Sedation/Airway: Not planned  Disposition: Outpatient     PONV Management:   Adult Risk Factors: Female, Non-Smoker, Postop Opioids   Prevention: Ondansetron, Propofol     CONSENT: Direct conversation   Plan and risks discussed with: Patient   Blood Products: Consent Deferred (Minimal Blood Loss)                   Max Fung MD

## 2020-09-17 NOTE — ANESTHESIA POSTPROCEDURE EVALUATION
Anesthesia POST Procedure Evaluation    Patient: Lisa Melton   MRN:     [id number] Gender:   female   Age:    30 year old :      1990        Preoperative Diagnosis: Encounter for cosmetic procedure [Z41.1]  Ruptured left breast implant [T85.43XA]   Procedure(s):  Remove and Replace breast implants   Postop Comments: No value filed.     Anesthesia Type: No value filed.          Postop Pain Control: Uneventful            Sign Out: Well controlled pain   PONV: No   Neuro/Psych: Uneventful            Sign Out: Acceptable/Baseline neuro status   Airway/Respiratory: Uneventful            Sign Out: Acceptable/Baseline resp. status   CV/Hemodynamics: Uneventful            Sign Out: Acceptable CV status   Other NRE: NONE   DID A NON-ROUTINE EVENT OCCUR? No         Last Anesthesia Record Vitals:  CRNA VITALS  2020 0814 - 2020 0914      2020             Pulse:  69    SpO2:  96 %          Last PACU Vitals:  Vitals Value Taken Time   BP 92/51 2020  9:00 AM   Temp 35.9  C (96.6  F) 2020  8:48 AM   Pulse 81 2020  9:05 AM   Resp 13 2020  9:05 AM   SpO2 100 % 2020  9:05 AM   Temp src     NIBP     Pulse 69 2020  8:45 AM   SpO2 96 % 2020  8:45 AM   Resp     Temp     Ht Rate     Temp 2     Vitals shown include unvalidated device data.      Electronically Signed By: Max Fung MD, 2020, 2:56 PM

## 2020-09-17 NOTE — ANESTHESIA CARE TRANSFER NOTE
Patient: Lisa Melton    Procedure(s):  Remove and Replace breast implants    Diagnosis: Encounter for cosmetic procedure [Z41.1]  Ruptured left breast implant [T85.43XA]  Diagnosis Additional Information: No value filed.    Anesthesia Type:   No value filed.     Note:  Airway :Oral Airway and Face Mask  Patient transferred to:PACU  Handoff Report: Identifed the Patient, Identified the Reponsible Provider, Reviewed the pertinent medical history, Discussed the surgical course, Reviewed Intra-OP anesthesia mangement and issues during anesthesia, Set expectations for post-procedure period and Allowed opportunity for questions and acknowledgement of understanding      Vitals: (Last set prior to Anesthesia Care Transfer)    CRNA VITALS  9/17/2020 0814 - 9/17/2020 0848      9/17/2020             Pulse:  69    SpO2:  96 %                Electronically Signed By: LALO Edge CRNA  September 17, 2020  8:48 AM

## 2020-09-17 NOTE — DISCHARGE INSTRUCTIONS
[facility]   Adult Discharge Orders & Instructions     For 24 hours after surgery    1. Get plenty of rest.  A responsible adult must stay with you for at least 24 hours after you leave the hospital.   2. Do not drive or use heavy equipment.  If you have weakness or tingling, don't drive or use heavy equipment until this feeling goes away.  3. Do not drink alcohol.  4. Avoid strenuous or risky activities.  Ask for help when climbing stairs.   5. You may feel lightheaded.  IF so, sit for a few minutes before standing.  Have someone help you get up.   6. If you have nausea (feel sick to your stomach): Drink only clear liquids such as apple juice, ginger ale, broth or 7-Up.  Rest may also help.  Be sure to drink enough fluids.  Move to a regular diet as you feel able.  7. You may have a slight fever. Call the doctor if your fever is over 100 F (37.7 C) (taken under the tongue) or lasts longer than 24 hours.  8. You may have a dry mouth, a sore throat, muscle aches or trouble sleeping.  These should go away after 24 hours.  9. Do not make important or legal decisions.     Call your doctor for any of the followin.  Signs of infection (fever, growing tenderness at the surgery site, a large amount of drainage or bleeding, severe pain, foul-smelling drainage, redness, swelling).    2. It has been over 8 to 10 hours since surgery and you are still not able to urinate (pass water).    3.  Headache for over 24 hours.    4.  Numbness, tingling or weakness the day after surgery (if you had spinal anesthesia).                  5. Signs of Covid-19 infection (temperature over 100 degrees, shortness of breath, cough, loss of taste/smell, generalized body aches, persistent headache,                  chills, sore throat, nausea/vomiting/diarrhea).    To contact Dr Barclay call:    [phone] - Day  [phone] - After hours pager

## 2020-09-17 NOTE — BRIEF OP NOTE
[facility] Brief Operative Note    Pre-operative diagnosis: Ruptured left breast implant [T85.43XA]   Post-operative diagnosis same   Procedure: Procedure(s):  Remove and Replace breast implants   Surgeon(s): Surgeon(s) and Role:     * Cain Graham MD - Primary   Estimated blood loss: none   Specimens: Implants will be returned to  by Dr Raygoza office     Findings: Ruptured left breast implant with gel extravasation     Assist: none  Complications: none  Condition: extubated and stable to PAR    Cain Graham MD

## 2020-09-25 NOTE — OP NOTE
Procedure Date: 09/17/2020      LOCATION:  [facility].       SURGEON:  Cain Graham MD.  ID #[id number]      PREOPERATIVE DIAGNOSES:     1.  History of breast augmentation using [employer] style 4000, smooth round high profile, 375 mL implant on 02/16/2017.   2.  MRI proven rupture of left breast implant.  An MRI obtained at [facility] on 08/07/2020 shows bilateral subpectoral silicone breast implants.  The right breast implant is completely intact.  The left breast implant shows intracapsular rupture.  The patient's breast implants are warrantied for her lifetime.  The patient will also have [employer] reimburse her for fees incurred with the surgery, as her original breast augmentation was only 3 years ago.      IMPLANTS REMOVED:     1.  Left breast implant:  [employer] style 4000, 375 mL, smooth, round, high profile [employer] MemoryGel breast implant, reference number [id number], lot #[id number], serial number [id number].     2.  Right breast implant:  [employer] style 4000 smooth round high profile, 375 mL [employer] MemoryGel breast implant, reference number 350-3754BC, lot #[id number], serial number [id number].      REPLACEMENT BREAST IMPLANTS:     1.  Left breast implant:  [employer] style 4000 smooth round high profile 375 mL [employer] MemoryGel breast implant, reference number [id number], lot #[id number], serial number [id number].     2.  Right breast implant:  [employer] style 4000 smooth round high profile, 375 mL [employer] MemoryGel breast implant, reference number 350-3754BC, lot #[id number], serial number [id number].      INDICATIONS:  The patient is a very pleasant and attractive 30-year-old female who underwent breast augmentation on 02/16/2017.  Her breast implants are [employer] MemoryGel smooth round high profile, style 4000 implants.  The patient noted a slight asymmetry of her breasts.  The patient underwent an MRI scan through [facility] on 08/07/2020 which revealed the left breast  implant had ruptured.  These breast implants are covered under [employer] lifetime warranty.  Also financial renumeration to the patient in light of the fact that the implant had ruptured in such a short time frame.  The patient understands that breast implants are not considered a lifetime medical device and she will likely have to have them replaced within her lifetime.  Her [employer] Memory[employer]el breast implants do carry a lifetime warranty.  The patient understands there is a phenomenon known as silent rupture where the implant may fail and neither the surgeon or patient are aware of this.  I recommend MRI surveillance for silent rupture at 8-10 years postoperatively.  The patient understands there is a phenomenon known as capsular contracture where the body may form an aggressive scar capsule around the breast implant.  The rate of capsular contracture in my practice is under 2% with implants placed in a submuscular position through an inframammary fold incision, as would be the case for this patient.  The patient was told there are 4 maneuvers I utilize to diminish the chance of capsular contracture.  The first is submuscular breast implant placement.  Submuscular breast implant placement confers a 3-4 fold decrease in rate of capsular contracture compared with subglandular breast augmentation.  Use of inframammary folds for placement of breast implant has been shown to confer a 10-fold reduction of capsular contracture compared with use of a periareolar incision.  This was shown in clinical studies performed by Marquez Madsen MD from [facility].  In his studies, the rate of capsular contracture using the inframammary fold incision was 0.59%, whereas his capsular contracture rate was 9.5% when a periareolar incision was used.  Use of breast pocket irrigations consisting of Ancef, gentamicin and bacitracin has been shown to decrease the rate of capsular contracture in clinical studies performed by  Surinder Castaneda MD from the [facility].  The last maneuver I use is use of a Davidson funnel for placement of the implant.  The implant allows for a no-touch technique, which theoretically should decrease the chance of developing a biofilm and therefore capsular contracture.  The patient was told there is a condition known as breast implant-associated lymphoma.  Approximately 500 cases have been reported in worldwide literature to date and all have been shown to occur in patients where the surgeon or patient have chosen to use a textured implant.  In my practice dating to 1998, I have used only smooth implants in my practice.  There are no cases of breast implant-associated lymphoma which have occurred with use of smooth breast implants.      The patient was told that her incision will be the inframammary fold.  She understands the risks of the operation include a chance of capsular contracture, implant failure, bleeding, infection, hematoma, seroma, hypertrophic scarring, and possible dissatisfaction with the cosmetic outcome.  Complications such as bleeding and infection are extremely rare in my practice.  I have had I single postoperative bleed and 1 single infection dating to 1994.  The patient was given a chance to ask questions and all were answered.  The patient provided informed consent for the procedure.      DESCRIPTION OF PROCEDURE AND FINDINGS:  In the preoperative holding area, the inframammary fold was marked and consent was obtained, in addition to our in-office consent, which she signed at her preoperative evaluation.  The patient was taken to the operating room and placed in the supine position on the operating room table.  A successful general anesthetic was induced using a laryngeal mask airway.  The patient received 2 grams of Ancef and 10 mg of Decadron intravenously prior to commencing the surgery.  The patient's chest was then prepped and draped in a routine sterile fashion.   The nipple-areolar complexes were then covered with 3M Tegaderm dressings.  A timeout was called at 7:39 a.m.  Incision was made at 7:40 a.m. It should be noted that sequential compression stockings were placed in the preoperative holding area, and these were on until discharge.  These were placed in a means of diminishing the chance of capsular contracture.  The patient had a body Kamla Hugger placed in an effort to keep her warm and avoid postoperative hypothermia.      Since the left breast implant was known to be ruptured and the right intact, I decided to place the right breast implant first.  A 4 cm incision was made excising the preexisting scar and this incision was carried down to the capsule.  The capsule was entered using a Brown-Adson forceps and Metzenbaum scissors.  The right breast implant was removed and was completely intact.  The right breast pocket was irrigated with the Ancef, gentamicin and bacitracin solution.  The pocket was then injected with 0.25% Marcaine with Kenalog to diminish postoperative discomfort and for the anti-inflammatory properties of the steroids.      At this point, all operative personnel changed their gloves.  The implant was opened on the back table.  The implant was soaked in the Ancef, gentamicin and bacitracin solution.  A Davidson funnel was then opened and trimmed to the appropriate size for a 375 mL implant.  The Davidson funnel was also lubricated with the Ancef, gentamicin and bacitracin solution.  The implant was then transferred from its sterile container to the Davidson funnel.  The implant was inserted utilizing a Davidson funnel and a no-touch technique.  Closure of the capsular layer was accomplished using 3-0 Vicryl suture.  The parenchymal layer of the breast was closed using 4-0 PDS suture in a buried interrupted fashion.  The skin was closed using deep dermal sutures of 4-0 Monocryl in a buried interrupted fashion.  The skin was then run using 3-0 Prolene suture  in running and buried continuous intracuticular fashion.  6-0 Prolene sutures were placed as needed for exact coaptation of the skin.      Attention was then directed to the left breast, where again I excised the scar.  I came upon the capsule.  It could be seen that the breast implant was ruptured and the breast implant fill material extravasated.  The implant was removed using laparotomy sponges.  I then cleansed the pocket using Techni-Care for the detergent nature of the surgical prep solution so that all silicone was completely removed.  This was a very thorough process.  At this point, the instruments used for removal of her ruptured implant were removed from the table and a new sterile set obtained.  All operative personnel changed their gloves.        The same technique was then utilized for opening of the implant, soaking it in antibiotic solution and inserting it using a Davidson funnel.  Closure was identical from left to right.  The inframammary fold incision was dressed using Mastisol and a 1/2 inch Steri-Strip.  The patient was placed in her surgical bra on the operating room table.      ESTIMATED BLOOD LOSS:  Minimal.      COMPLICATIONS:  None.      CONDITION:  Stable to postanesthesia recovery.      OPERATIVE TIMES:  Room in time 7:19.  Timeout 7:39.  Incision 7:40 a.m.  Procedure end time 8:39 a.m.  Out of room time 8:42 a.m.  It should be noted, the patient paid for 1.5 hours of paid surgical operative time.  The operation was performed in 59 minutes.  This is 30 minutes ahead of paid surgical operative time.  The total operating room time was also far under paid time.  The patient should receive no bills postoperatively.         AMBER HINDS MD             D: 2020   T: 2020   MT: NATHANAEL      Name:     LUCIAN PAZ   MRN:      [id number]        Account:        [id number]   :      1990           Procedure Date: 2020      Document: [id number]

## (undated) DEVICE — SYR BULB IRRIG DOVER 60 ML LATEX FREE 67000

## (undated) DEVICE — PACK MINOR SBA15MIFSE

## (undated) DEVICE — SU VICRYL 3-0 SH 27" J316H

## (undated) DEVICE — STPL SKIN 35W 054887

## (undated) DEVICE — GLOVE PROTEXIS W/NEU-THERA 7.5  2D73TE75

## (undated) DEVICE — GLOVE PROTEXIS W/NEU-THERA 6.5  2D73TE65

## (undated) DEVICE — ESU ELEC BLADE 2.75" COATED/INSULATED E1455

## (undated) DEVICE — GLOVE PROTEXIS BLUE W/NEU-THERA 7.0  2D73EB70

## (undated) DEVICE — SPECIMEN CONTAINER 4OZ

## (undated) DEVICE — DRSG KERLIX FLUFFS X5

## (undated) DEVICE — DRAPE BREAST/CHEST 29420

## (undated) DEVICE — DECANTER TRANSFER DEVICE 2008S

## (undated) DEVICE — PREP CHLORAPREP 26ML TINTED ORANGE  260815

## (undated) DEVICE — DRSG STERI STRIP 1/2X4" R1547

## (undated) DEVICE — SOL WATER IRRIG 1000ML BOTTLE 07139-09

## (undated) DEVICE — SUCTION TIP YANKAUER W/O VENT K86

## (undated) DEVICE — ADH LIQUID MASTISOL TOPICAL VIAL 2-3ML 0523-48

## (undated) DEVICE — SUCTION CANISTER MEDIVAC LINER 1500ML W/LID 65651-515

## (undated) DEVICE — DRSG TEGADERM 2 3/8X2 3/4" 1624W

## (undated) DEVICE — NDL SPINAL 25GA 3.5" QUINCKE 405180

## (undated) RX ORDER — LIDOCAINE HYDROCHLORIDE 10 MG/ML
INJECTION, SOLUTION EPIDURAL; INFILTRATION; INTRACAUDAL; PERINEURAL
Status: DISPENSED
Start: 2020-09-17

## (undated) RX ORDER — ACETAMINOPHEN 325 MG/1
TABLET ORAL
Status: DISPENSED
Start: 2020-09-17

## (undated) RX ORDER — TRIAMCINOLONE ACETONIDE 40 MG/ML
INJECTION, SUSPENSION INTRA-ARTICULAR; INTRAMUSCULAR
Status: DISPENSED
Start: 2020-09-17

## (undated) RX ORDER — PROPOFOL 10 MG/ML
INJECTION, EMULSION INTRAVENOUS
Status: DISPENSED
Start: 2020-09-17

## (undated) RX ORDER — GENTAMICIN 40 MG/ML
INJECTION, SOLUTION INTRAMUSCULAR; INTRAVENOUS
Status: DISPENSED
Start: 2020-09-17

## (undated) RX ORDER — ONDANSETRON 2 MG/ML
INJECTION INTRAMUSCULAR; INTRAVENOUS
Status: DISPENSED
Start: 2020-09-17

## (undated) RX ORDER — DEXAMETHASONE SODIUM PHOSPHATE 4 MG/ML
INJECTION, SOLUTION INTRA-ARTICULAR; INTRALESIONAL; INTRAMUSCULAR; INTRAVENOUS; SOFT TISSUE
Status: DISPENSED
Start: 2020-09-17

## (undated) RX ORDER — OXYCODONE HYDROCHLORIDE 5 MG/1
TABLET ORAL
Status: DISPENSED
Start: 2020-09-17

## (undated) RX ORDER — EPINEPHRINE 1 MG/ML
INJECTION, SOLUTION INTRAMUSCULAR; SUBCUTANEOUS
Status: DISPENSED
Start: 2020-09-17

## (undated) RX ORDER — CEFAZOLIN SODIUM 1 G/3ML
INJECTION, POWDER, FOR SOLUTION INTRAMUSCULAR; INTRAVENOUS
Status: DISPENSED
Start: 2020-09-17

## (undated) RX ORDER — BUPIVACAINE HYDROCHLORIDE AND EPINEPHRINE 2.5; 5 MG/ML; UG/ML
INJECTION, SOLUTION INFILTRATION; PERINEURAL
Status: DISPENSED
Start: 2020-09-17

## (undated) RX ORDER — FENTANYL CITRATE 50 UG/ML
INJECTION, SOLUTION INTRAMUSCULAR; INTRAVENOUS
Status: DISPENSED
Start: 2020-09-17

## (undated) RX ORDER — LIDOCAINE HYDROCHLORIDE 20 MG/ML
INJECTION, SOLUTION INFILTRATION; PERINEURAL
Status: DISPENSED
Start: 2020-09-17